# Patient Record
Sex: MALE | Race: WHITE | NOT HISPANIC OR LATINO | Employment: FULL TIME | URBAN - NONMETROPOLITAN AREA
[De-identification: names, ages, dates, MRNs, and addresses within clinical notes are randomized per-mention and may not be internally consistent; named-entity substitution may affect disease eponyms.]

---

## 2022-04-03 ENCOUNTER — HOSPITAL ENCOUNTER (EMERGENCY)
Facility: HOSPITAL | Age: 62
Discharge: HOME | End: 2022-04-03
Attending: EMERGENCY MEDICINE | Admitting: EMERGENCY MEDICINE

## 2022-04-03 VITALS
WEIGHT: 170 LBS | BODY MASS INDEX: 24.34 KG/M2 | DIASTOLIC BLOOD PRESSURE: 90 MMHG | SYSTOLIC BLOOD PRESSURE: 175 MMHG | OXYGEN SATURATION: 99 % | TEMPERATURE: 98.4 F | HEART RATE: 53 BPM | HEIGHT: 70 IN | RESPIRATION RATE: 18 BRPM

## 2022-04-03 DIAGNOSIS — H18.893 CORNEAL IRRITATION OF BOTH EYES: Primary | ICD-10-CM

## 2022-04-03 PROCEDURE — 99283 EMERGENCY DEPT VISIT LOW MDM: CPT

## 2022-04-03 RX ORDER — TETRACAINE HYDROCHLORIDE 5 MG/ML
2 SOLUTION OPHTHALMIC ONCE
Status: COMPLETED | OUTPATIENT
Start: 2022-04-03 | End: 2022-04-03

## 2022-04-03 RX ADMIN — TETRACAINE HYDROCHLORIDE 2 DROP: 5 SOLUTION OPHTHALMIC at 20:34

## 2022-04-04 NOTE — ED PROVIDER NOTES
"Subjective   Patient states he works around [employer]umina at work.  He states this is a fine dust and he got some in his eyes earlier today while at work around 9:00 this morning.  He states he tried to wash his eyes out by splashing water on them and by using 3 bottles of saline flush.  He reports his eyes continue to have a burning sensation and watering to them.  His work setting up with an Dr. Sammy Perkins optometrist in [place] who he saw today.  He states his eyes were numbed and he had a full eye exam which he was told was normal.  He was given tobramycin/dexamethasone drops to use 1 drop in each eye every 6 hours.  He states he has used these drops once but the burning and watering sensation continues.  He states it feels like that there is grit in his eyes.  There is a yellowish discharge bilaterally.  He states it feels like there is a film over his eyes that he is looking through.          Review of Systems   Eyes: Positive for pain (Burning sensation), discharge and redness.       History reviewed. No pertinent past medical history.    No Known Allergies    History reviewed. No pertinent surgical history.    History reviewed. No pertinent family history.    Social History     Socioeconomic History   • Marital status: Single           Objective    Blood pressure 175/90, pulse 53, temperature 98.4 °F (36.9 °C), temperature source Temporal, resp. rate 18, height 177.8 cm (70\"), weight 77.1 kg (170 lb), SpO2 99 %.      Physical Exam  Constitutional:       Appearance: He is not ill-appearing.   HENT:      Head: Normocephalic and atraumatic.   Eyes:      General: Lids are normal. Gaze aligned appropriately.         Right eye: Discharge (Yellow watery) present.         Left eye: Discharge (Yellow watery) present.     Extraocular Movements: Extraocular movements intact.      Conjunctiva/sclera:      Right eye: Right conjunctiva is injected. No hemorrhage.     Left eye: Left conjunctiva is injected. No " hemorrhage.     Pupils: Pupils are equal, round, and reactive to light.   Cardiovascular:      Rate and Rhythm: Normal rate.   Pulmonary:      Effort: Pulmonary effort is normal.   Musculoskeletal:         General: Normal range of motion.   Skin:     General: Skin is warm and dry.      Capillary Refill: Capillary refill takes less than 2 seconds.   Neurological:      Mental Status: He is alert and oriented to person, place, and time.   Psychiatric:         Mood and Affect: Mood normal.         Behavior: Behavior normal.         Procedures           ED Course  ED Course as of 04/04/22 1104   Sun Apr 03, 2022 2117 Patient states his eyes feel some better. Remains a burning sensation. Advised my reinsert gtts from ophthalmologist. Nursing aware to complete visual acuity.  [SH]   2130 Visual acuity reviewed. Patient does not have any further development of discharge. He states his eyes have continued to improve at this time. Discussed follow up with ophthalmology tomorrow and patient verbalized understanding. Patient to return to ER if symptoms worsen.  [SH]      ED Course User Index  [SH] Guerda Ricardo APRN                                                 [facility]    Final diagnoses:   Corneal irritation of both eyes       ED Disposition  ED Disposition     ED Disposition   Discharge    Condition   Stable    Comment   --             Ophthalmology  Ophthalmology of your choice.  Schedule an appointment as soon as possible for a visit   Call tomorrow for appointment.         Medication List      No changes were made to your prescriptions during this visit.          Guerda Ricardo APRN  04/04/22 1104

## 2022-04-04 NOTE — DISCHARGE INSTRUCTIONS
Continue to use your eye gtts as directed. Follow up with an ophthalmoligst of your choice for reevaluation - call tomorrow for appointment. Return back to ER if symptoms worsen.